# Patient Record
Sex: MALE | Race: WHITE | NOT HISPANIC OR LATINO | Employment: UNEMPLOYED | URBAN - METROPOLITAN AREA
[De-identification: names, ages, dates, MRNs, and addresses within clinical notes are randomized per-mention and may not be internally consistent; named-entity substitution may affect disease eponyms.]

---

## 2024-11-25 DIAGNOSIS — S61.239A: Primary | ICD-10-CM

## 2024-12-11 ENCOUNTER — HOSPITAL ENCOUNTER (OUTPATIENT)
Dept: RADIOLOGY | Facility: HOSPITAL | Age: 39
Discharge: HOME | End: 2024-12-11
Attending: SPECIALIST
Payer: MEDICAID

## 2024-12-11 ENCOUNTER — OFFICE VISIT (OUTPATIENT)
Dept: ORTHOPEDICS | Facility: CLINIC | Age: 39
End: 2024-12-11
Payer: MEDICAID

## 2024-12-11 VITALS
DIASTOLIC BLOOD PRESSURE: 83 MMHG | TEMPERATURE: 98 F | BODY MASS INDEX: 31.5 KG/M2 | RESPIRATION RATE: 18 BRPM | HEIGHT: 71 IN | HEART RATE: 79 BPM | WEIGHT: 225 LBS | SYSTOLIC BLOOD PRESSURE: 149 MMHG | OXYGEN SATURATION: 98 %

## 2024-12-11 DIAGNOSIS — S61.239A: ICD-10-CM

## 2024-12-11 DIAGNOSIS — S64.491A INJURY OF DIGITAL NERVE OF LEFT INDEX FINGER, INITIAL ENCOUNTER: Primary | ICD-10-CM

## 2024-12-11 PROCEDURE — 99203 OFFICE O/P NEW LOW 30 MIN: CPT | Mod: S$PBB,,, | Performed by: SPECIALIST

## 2024-12-11 PROCEDURE — 73130 X-RAY EXAM OF HAND: CPT | Mod: TC,LT

## 2024-12-11 PROCEDURE — 4010F ACE/ARB THERAPY RXD/TAKEN: CPT | Mod: CPTII,,, | Performed by: SPECIALIST

## 2024-12-11 PROCEDURE — 1159F MED LIST DOCD IN RCRD: CPT | Mod: CPTII,,, | Performed by: SPECIALIST

## 2024-12-11 PROCEDURE — 3079F DIAST BP 80-89 MM HG: CPT | Mod: CPTII,,, | Performed by: SPECIALIST

## 2024-12-11 PROCEDURE — 99213 OFFICE O/P EST LOW 20 MIN: CPT | Mod: PBBFAC,25

## 2024-12-11 PROCEDURE — 3077F SYST BP >= 140 MM HG: CPT | Mod: CPTII,,, | Performed by: SPECIALIST

## 2024-12-11 PROCEDURE — 3008F BODY MASS INDEX DOCD: CPT | Mod: CPTII,,, | Performed by: SPECIALIST

## 2024-12-11 NOTE — PROGRESS NOTES
[facility] Past Medical History:   Diagnosis Date    Hypertension        History reviewed. No pertinent surgical history.    No current outpatient medications on file.     No current facility-administered medications for this visit.       Review of patient's allergies indicates:  No Known Allergies    No family history on file.    Social History     Socioeconomic History    Marital status:    Tobacco Use    Smoking status: Never    Smokeless tobacco: Never       Chief Complaint:   Chief Complaint   Patient presents with    Left Hand - Injury     Patient is here today for Puncture on finger of left hand .  Was this an injury  yes November 11, 2024 . Is this a work Third Party.       Consulting Physician: Chelsie Gtz PA    History of present illness:    This is a 39 y.o. year old male who complains of some mild numbness over radial aspect of the left index finger for where he had a puncture wound with a broken drill bit while he was drilling out a set screw on a carburetor about a month ago.  The wound healed.  He has full range motion of the finger.  He has a little bit of light touch sensory deficit along the radial aspect of his left index finger.  He is back at work fully functioning.    Review of Systems:    Constitution:   Denies chills, fever, and sweats.  HENT:   Denies headaches or blurry vision.  Cardiovascular:  Denies chest pain or irregular heart beat.  Respiratory:   Denies cough or shortness of breath.  Gastrointestinal:  Denies abdominal pain, nausea, or vomiting.  Musculoskeletal:   Denies muscle cramps.  Neurological:   Denies dizziness or focal weakness.  Psychiatric/Behavior: Normal mental status.  Hematology/Lymph:  Denies bleeding problem or easy bruising/bleeding.  Skin:    Denies rash or suspicious lesions.    Examination:    Vital Signs:    Vitals:    12/11/24 1032   BP: (!) 149/83   Pulse: 79   Resp: 18   Temp: 98 °F (36.7 °C)   TempSrc: Oral   SpO2: 98%   Weight: 102.1 kg (225 lb)  "  Height: 5' 11" (1.803 m)       Body mass index is 31.38 kg/m².    Constitution:   Well-developed, well nourished patient in no acute distress.  Neurological:   Alert and oriented x 3 and cooperative to examination.     Psychiatric/Behavior: Normal mental status.  Respiratory:   No shortness of breath.non labored breathing.  Cardiovascular: Regular rate and rhythm  Eyes:    Extraoccular muscles intact  Skin:    No scars, rash or suspicious lesions.    Physical Exam:  Left hand exam:   Healed puncture wound from his injury over the radial aspect of the distal phalanx of the left index finger.  No swelling, no redness, no increased heat  No evidence of infection   Mild decreased light touch sensation along the radial distal tip of the left index finger with normal sensation along the palmar dorsal and ulnar aspect of the tip of the left index finger   Full active and passive range of motion   No nailbed injury  Normal capillary refill   Radiographs of the left hand reviewed today which show no evidence of fracture or dislocation and no evidence of foreign body.  Impression:  As above.       Assessment: Injury of digital nerve of left index finger, initial encounter    Puncture wound of finger, left  -     Ambulatory referral/consult to Orthopedics  -     X-Ray Hand 3 view Left; Future; Expected date: 12/11/2024        Plan:  Activities as tolerated follow up p.r.n.  Symptoms should improve gradually over time but he was informed of mild loss of light touch sensation along the radial aspect of the tip of his left index finger would likely remain to some degree for the rest of his life.      DISCLAIMER: This note may have been dictated using voice recognition software and may contain grammatical errors.     NOTE: Consult report sent to referring provider via [facility] EMR.    "